# Patient Record
Sex: FEMALE | Race: WHITE | URBAN - METROPOLITAN AREA
[De-identification: names, ages, dates, MRNs, and addresses within clinical notes are randomized per-mention and may not be internally consistent; named-entity substitution may affect disease eponyms.]

---

## 2018-06-01 ENCOUNTER — OUTPATIENT (OUTPATIENT)
Dept: OUTPATIENT SERVICES | Facility: HOSPITAL | Age: 38
LOS: 1 days | Discharge: HOME | End: 2018-06-01

## 2018-06-01 DIAGNOSIS — Z00.8 ENCOUNTER FOR OTHER GENERAL EXAMINATION: ICD-10-CM

## 2018-06-01 LAB — HCG UR QL: NEGATIVE — SIGNIFICANT CHANGE UP

## 2018-07-18 ENCOUNTER — OUTPATIENT (OUTPATIENT)
Dept: OUTPATIENT SERVICES | Facility: HOSPITAL | Age: 38
LOS: 1 days | Discharge: HOME | End: 2018-07-18

## 2018-07-18 DIAGNOSIS — Z00.8 ENCOUNTER FOR OTHER GENERAL EXAMINATION: ICD-10-CM

## 2018-07-18 LAB — HCG UR QL: NEGATIVE — SIGNIFICANT CHANGE UP

## 2019-01-22 PROBLEM — Z00.00 ENCOUNTER FOR PREVENTIVE HEALTH EXAMINATION: Status: ACTIVE | Noted: 2019-01-22

## 2019-03-15 ENCOUNTER — APPOINTMENT (OUTPATIENT)
Dept: CARDIOLOGY | Facility: CLINIC | Age: 39
End: 2019-03-15

## 2019-05-09 RX ORDER — LOSARTAN POTASSIUM 25 MG/1
25 TABLET, FILM COATED ORAL DAILY
Qty: 90 | Refills: 0 | Status: ACTIVE | COMMUNITY
Start: 2019-05-09 | End: 1900-01-01

## 2019-09-01 ENCOUNTER — RX RENEWAL (OUTPATIENT)
Age: 39
End: 2019-09-01

## 2019-10-10 ENCOUNTER — APPOINTMENT (OUTPATIENT)
Dept: CARDIOLOGY | Facility: CLINIC | Age: 39
End: 2019-10-10
Payer: COMMERCIAL

## 2019-10-10 VITALS
SYSTOLIC BLOOD PRESSURE: 118 MMHG | BODY MASS INDEX: 26.41 KG/M2 | HEIGHT: 59 IN | DIASTOLIC BLOOD PRESSURE: 82 MMHG | WEIGHT: 131 LBS

## 2019-10-10 DIAGNOSIS — Z78.9 OTHER SPECIFIED HEALTH STATUS: ICD-10-CM

## 2019-10-10 DIAGNOSIS — F17.210 NICOTINE DEPENDENCE, CIGARETTES, UNCOMPLICATED: ICD-10-CM

## 2019-10-10 PROCEDURE — 99213 OFFICE O/P EST LOW 20 MIN: CPT

## 2019-10-10 PROCEDURE — 93000 ELECTROCARDIOGRAM COMPLETE: CPT

## 2019-10-10 NOTE — ASSESSMENT
[FreeTextEntry1] : nsr wnl no lvh\par  meds renewed\par  advise to stop smoking\par  needs blood work

## 2019-10-10 NOTE — PHYSICAL EXAM
[General Appearance - Well Developed] : well developed [Normal Appearance] : normal appearance [Well Groomed] : well groomed [General Appearance - Well Nourished] : well nourished [General Appearance - In No Acute Distress] : no acute distress [No Deformities] : no deformities [Heart Sounds] : normal S1 and S2 [Heart Rate And Rhythm] : heart rate and rhythm were normal [Murmurs] : no murmurs present [Arterial Pulses Normal] : the arterial pulses were normal [Edema] : no peripheral edema present [Veins - Varicosity Changes] : no varicosital changes were noted in the lower extremities

## 2019-12-02 ENCOUNTER — RX RENEWAL (OUTPATIENT)
Age: 39
End: 2019-12-02

## 2020-01-14 ENCOUNTER — APPOINTMENT (OUTPATIENT)
Dept: CARDIOLOGY | Facility: CLINIC | Age: 40
End: 2020-01-14

## 2020-04-25 ENCOUNTER — MESSAGE (OUTPATIENT)
Age: 40
End: 2020-04-25

## 2020-05-02 ENCOUNTER — APPOINTMENT (OUTPATIENT)
Dept: DISASTER EMERGENCY | Facility: HOSPITAL | Age: 40
End: 2020-05-02

## 2020-05-04 ENCOUNTER — RX RENEWAL (OUTPATIENT)
Age: 40
End: 2020-05-04

## 2020-05-04 LAB
SARS-COV-2 IGG SERPL IA-ACNC: 0.2 RATIO
SARS-COV-2 IGG SERPL QL IA: NEGATIVE

## 2020-06-02 ENCOUNTER — TRANSCRIPTION ENCOUNTER (OUTPATIENT)
Age: 40
End: 2020-06-02

## 2020-08-21 ENCOUNTER — RX RENEWAL (OUTPATIENT)
Age: 40
End: 2020-08-21

## 2020-08-21 RX ORDER — LOSARTAN POTASSIUM 25 MG/1
25 TABLET, FILM COATED ORAL DAILY
Qty: 30 | Refills: 2 | Status: ACTIVE | COMMUNITY
Start: 2019-09-26 | End: 1900-01-01

## 2020-10-04 ENCOUNTER — TRANSCRIPTION ENCOUNTER (OUTPATIENT)
Age: 40
End: 2020-10-04

## 2020-11-20 ENCOUNTER — RX RENEWAL (OUTPATIENT)
Age: 40
End: 2020-11-20

## 2021-11-09 ENCOUNTER — APPOINTMENT (OUTPATIENT)
Dept: CARDIOLOGY | Facility: CLINIC | Age: 41
End: 2021-11-09
Payer: COMMERCIAL

## 2021-11-09 VITALS
HEART RATE: 84 BPM | HEIGHT: 59 IN | BODY MASS INDEX: 27.62 KG/M2 | DIASTOLIC BLOOD PRESSURE: 70 MMHG | WEIGHT: 137 LBS | TEMPERATURE: 97.5 F | SYSTOLIC BLOOD PRESSURE: 130 MMHG

## 2021-11-09 DIAGNOSIS — I10 ESSENTIAL (PRIMARY) HYPERTENSION: ICD-10-CM

## 2021-11-09 PROCEDURE — 93000 ELECTROCARDIOGRAM COMPLETE: CPT

## 2021-11-09 PROCEDURE — 99213 OFFICE O/P EST LOW 20 MIN: CPT

## 2021-11-09 RX ORDER — LOSARTAN POTASSIUM 25 MG/1
25 TABLET, FILM COATED ORAL DAILY
Qty: 90 | Refills: 2 | Status: ACTIVE | COMMUNITY
Start: 2021-11-09 | End: 1900-01-01

## 2021-11-09 NOTE — ASSESSMENT
Maricarmen,  Here's a copy of your A1c result for your records.  I'll see you in 4 months.  Emily Phan NP  Endocrinology   [FreeTextEntry1] :  patrizia elias nsr wnl\par  bp 120/80 well controlled\par  need blood work\par  pt is advised to see gyn\par  meds renewe

## 2021-12-05 ENCOUNTER — TRANSCRIPTION ENCOUNTER (OUTPATIENT)
Age: 41
End: 2021-12-05

## 2022-04-19 ENCOUNTER — TRANSCRIPTION ENCOUNTER (OUTPATIENT)
Age: 42
End: 2022-04-19

## 2022-09-19 ENCOUNTER — NON-APPOINTMENT (OUTPATIENT)
Age: 42
End: 2022-09-19

## 2023-07-14 ENCOUNTER — EMERGENCY (EMERGENCY)
Facility: HOSPITAL | Age: 43
LOS: 0 days | Discharge: HOME | End: 2023-07-14
Attending: STUDENT IN AN ORGANIZED HEALTH CARE EDUCATION/TRAINING PROGRAM
Payer: COMMERCIAL

## 2023-07-14 VITALS
RESPIRATION RATE: 16 BRPM | DIASTOLIC BLOOD PRESSURE: 87 MMHG | TEMPERATURE: 96 F | WEIGHT: 136.91 LBS | SYSTOLIC BLOOD PRESSURE: 151 MMHG | HEART RATE: 92 BPM | OXYGEN SATURATION: 100 % | HEIGHT: 59 IN

## 2023-07-14 DIAGNOSIS — M25.571 PAIN IN RIGHT ANKLE AND JOINTS OF RIGHT FOOT: ICD-10-CM
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
## 2023-07-14 DIAGNOSIS — I10 ESSENTIAL (PRIMARY) HYPERTENSION: ICD-10-CM

## 2023-07-14 PROCEDURE — 99284 EMERGENCY DEPT VISIT MOD MDM: CPT

## 2023-07-14 PROCEDURE — 99283 EMERGENCY DEPT VISIT LOW MDM: CPT | Mod: 25

## 2023-07-14 PROCEDURE — 73610 X-RAY EXAM OF ANKLE: CPT | Mod: RT

## 2023-07-14 PROCEDURE — 73610 X-RAY EXAM OF ANKLE: CPT | Mod: 26,RT

## 2023-07-14 NOTE — ED ADULT TRIAGE NOTE - CHIEF COMPLAINT QUOTE
"I tripped on a floor mat at work and I fell. My right ankle hurts." S/P fall @ 6am today, denies head injury.

## 2023-07-14 NOTE — ED PROVIDER NOTE - NSFOLLOWUPCLINICS_GEN_ALL_ED_FT
[facility]  Orthpedic  [address], [place]   Phone: [phone]  Fax:   Follow Up Time: Routine

## 2023-07-14 NOTE — ED PROVIDER NOTE - CLINICAL SUMMARY MEDICAL DECISION MAKING FREE TEXT BOX
Independent review of ankle XR without evidence of acute fx. Likely sprain. Stable for d/c at this time. Plan to f/u with ortho. Strict return precautions discussed. Patient understands plan and agrees.

## 2023-07-14 NOTE — ED PROVIDER NOTE - OBJECTIVE STATEMENT
43-year-old female with past medical history of hypertension who presents for right ankle pain.  Patient states that this morning she was at work when she tripped over and fell on a soft mat.  Reports inverting her right ankle.  Endorses pain with bearing weight on right foot.  Endorses pain to the right posterior lateral malleolus.  No open wounds.  Pulses intact. Full ROM.

## 2023-07-14 NOTE — ED ADULT NURSE NOTE - NSFALLRISKINTERV_ED_ALL_ED

## 2023-07-14 NOTE — ED PROVIDER NOTE - PATIENT PORTAL LINK FT
You can access the FollowMyHealth Patient Portal offered by [facility] by registering at the following website: [email or web address]. By joining [employer]’s FollowMyHealth portal, you will also be able to view your health information using other applications (apps) compatible with our system.

## 2023-07-14 NOTE — ED PROVIDER NOTE - PHYSICAL EXAMINATION
VITAL SIGNS: I have reviewed nursing notes and confirm.  CONSTITUTIONAL: well-appearing, non-toxic, NAD  SKIN: Warm dry, normal skin turgor  HEAD: NCAT  EYES: EOMI, PERRLA, no scleral icterus  ENT: Moist mucous membranes, normal pharynx with no erythema or exudates  NECK: Supple; non tender. Full ROM. No cervical LAD  CARD: RRR, no murmurs, rubs or gallops  RESP: clear to ausculation b/l.  No rales, rhonchi, or wheezing.  ABD: soft, + BS, non-tender, non-distended, no rebound or guarding. No CVA tenderness  EXT: Full ROM, posterior lateral malleolus tender to palpation, slight edema present on posterior lateral malleolus, no open wounds, no pedal edema, no calf tenderness. Pain with bearing weight on right leg.   NEURO: normal motor. normal sensory. CN II-XII intact. Cerebellar testing normal. Normal gait.  PSYCH: Cooperative, appropriate.

## 2023-07-14 NOTE — ED PROVIDER NOTE - ATTENDING CONTRIBUTION TO CARE
43 y.o F w/ pmhx HTN p/w R ankle pain after she inverted her foot this morning at work. No HT.    Physical Exam:  VSS  CONSTITUTIONAL: well-appearing, in NAD  SKIN: Warm dry, normal skin turgor  HEAD: NCAT  EYES: EOMI, PERRL, no scleral icterus, conjunctiva pink  ENT: normal pharynx with no erythema or exudates  NECK: Supple; non tender. Full ROM.  CARD: RRR, no murmurs.  RESP: clear to ausculation b/l. No crackles or wheezing.  ABD: soft, non-tender, non-distended, no rebound or guarding.  EXT: Full ROM, tenderness at lateral malleolus. + weight bearing. Slight swelling compared to L ankle.  NEURO: normal motor. normal sensory. CN II-XII intact. Cerebellar testing normal. Normal gait.  PSYCH: Cooperative, appropriate.    A/P: Likely sprain due to mechanism but will obtain XR due to malleolar tenderness.

## 2023-07-14 NOTE — ED PROVIDER NOTE - NSFOLLOWUPINSTRUCTIONS_ED_ALL_ED_FT
Our Emergency Department Referral Coordinators will be reaching out to you in the next 24-48 hours from 9:00am to 5:00pm with a follow up appointment. Please expect a phone call from the hospital in that time frame. If you do not receive a call or if you have any questions or concerns, you can reach them at   [phone]    Ankle Pain    Many things can cause ankle pain, including an injury to the area and overuse of the ankle. The ankle joint holds your body weight and allows you to move around. Ankle pain can occur on either side or the back of one ankle or both ankles. Ankle pain may be sharp and burning or dull and aching. There may be tenderness, stiffness, redness, or warmth around the ankle.     HOME CARE INSTRUCTIONS  Activity    Rest your ankle as told by your health care provider. Avoid any activities that cause ankle pain.  Do exercises as told by your health care provider.  Ask your health care provider if you can drive.    Using a Brace, a Bandage, or Crutches    If you were given a brace:  Wear it as told by your health care provider.  Remove it when you take a bath or a shower.  Try not to move your ankle very much, but wiggle your toes from time to time. This helps to prevent swelling.  If you were given an elastic bandage:  Remove it when you take a bath or a shower.  Try not to move your ankle very much, but wiggle your toes from time to time. This helps to prevent swelling.  Adjust the bandage to make it more comfortable if it feels too tight.  Loosen the bandage if you have numbness or tingling in your foot or if your foot turns cold and blue.  If you have crutches, use them as told by your health care provider. Continue to use them until you can walk without feeling pain in your ankle.    Managing Pain, Stiffness, and Swelling    Raise (elevate) your ankle above the level of your heart while you are sitting or lying down.  If directed, apply ice to the area:  Put ice in a plastic bag.  Place a towel between your skin and the bag.  Leave the ice on for 20 minutes, 2–3 times per day.    General Instructions    Keep all follow-up visits as told by your health care provider. This is important.  Record this information that may be helpful for you and your health care provider:  How often you have ankle pain.  Where the pain is located.  What the pain feels like.  Take over-the-counter and prescription medicines only as told by your health care provider.    SEEK MEDICAL CARE IF:  Your pain gets worse.  Your pain is not relieved with medicines.  You have a fever or chills.  You are having more trouble with walking.  You have new symptoms.    SEEK IMMEDIATE MEDICAL CARE IF:  Your foot, leg, toes, or ankle tingles or becomes numb.  Your foot, leg, toes, or ankle becomes swollen.  Your foot, leg, toes, or ankle turns pale or blue.    ADDITIONAL NOTES AND INSTRUCTIONS    Please follow up with your Primary MD in 24-48 hr.  Seek immediate medical care for any new/worsening signs or symptoms.
Joy SWANN

## 2023-07-16 ENCOUNTER — NON-APPOINTMENT (OUTPATIENT)
Age: 43
End: 2023-07-16

## 2023-07-16 ENCOUNTER — APPOINTMENT (OUTPATIENT)
Dept: ORTHOPEDIC SURGERY | Facility: CLINIC | Age: 43
End: 2023-07-16
Payer: OTHER MISCELLANEOUS

## 2023-07-16 VITALS — BODY MASS INDEX: 27.62 KG/M2 | WEIGHT: 137 LBS | HEIGHT: 59 IN

## 2023-07-16 DIAGNOSIS — S93.491A SPRAIN OF OTHER LIGAMENT OF RIGHT ANKLE, INITIAL ENCOUNTER: ICD-10-CM

## 2023-07-16 PROCEDURE — 99203 OFFICE O/P NEW LOW 30 MIN: CPT | Mod: ACP

## 2023-07-16 NOTE — DISCUSSION/SUMMARY
[de-identified] : Discussed x-rays in detail, negative for acute fracture.  Patient has ankle sprain and is recovering well.  Advised continuation of rest, ice/heat, range of motion exercise, warm water Epsom soaks, anti-inflammatory medication as needed.  Advised use of ankle sleeve.  Patient may return to work 7/19/2023.  Call if any questions or concerns.  Patient understands agrees with plan.  Follow-up as needed.

## 2023-07-16 NOTE — DATA REVIEWED
[FreeTextEntry1] : X-ray right ankle [facility]: No acute fractures, subluxation, dislocations.  Noted chronic injury along area of medial malleolus/navicular

## 2023-07-16 NOTE — HISTORY OF PRESENT ILLNESS
[de-identified] : Patient is a 43-year-old female accompanied by  here for evaluation of right ankle injury.  Patient states on 7/14/2023 while at work she tripped and twisted her right ankle.  Patient was immediate pain.  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  Patient was told to follow-up with orthopedics.  Patient states that she is feeling much better today.  Patient is ambulating with minimal discomfort denies numbness/tingling, denies instability.

## 2023-07-16 NOTE — PHYSICAL EXAM
[Right] : right foot and ankle [] : non-antalgic [FreeTextEntry9] : Good range of motion with minimal discomfort [de-identified] : Good strength

## 2023-11-28 ENCOUNTER — NON-APPOINTMENT (OUTPATIENT)
Age: 43
End: 2023-11-28

## 2024-03-17 ENCOUNTER — NON-APPOINTMENT (OUTPATIENT)
Age: 44
End: 2024-03-17

## 2024-10-03 ENCOUNTER — NON-APPOINTMENT (OUTPATIENT)
Age: 44
End: 2024-10-03